# Patient Record
(demographics unavailable — no encounter records)

---

## 2024-10-18 NOTE — HISTORY OF PRESENT ILLNESS
[Y] : Positive pregnancy history [FreeTextEntry1] : PATIENT PRESENT FOR ANNUAL GYN EXAM  [TextBox_4] : pt presents for annual with no complaints  hx of osteoporosis and osteopenia   [Mammogramdate] : 12/19/23 [PapSmeardate] : 8/1//23 [BoneDensityDate] : 2/22/24 [ColonoscopyDate] : 2023 [[facility] : [facility]

## 2024-10-18 NOTE — COUNSELING
[Nutrition/ Exercise/ Weight Management] : nutrition, exercise, weight management [Vitamins/Supplements] : vitamins/supplements [Breast Self Exam] : breast self exam [Bladder Hygiene] : bladder hygiene N/A

## 2024-10-18 NOTE — PROCEDURE
[Cervical Pap Smear] : cervical Pap smear [Liquid Base] : liquid base [Tolerated Well] : the patient tolerated the procedure well [No Complications] : there were no complications [de-identified] : HPV

## 2024-10-22 NOTE — PLAN
[TextEntry] : asthma  continue symbicort Q12 hrs  albuterol as needed  PFT next visit  -elina  patient was counseled to use the machine every night at least 4 hrs  also counseled for weight loss and exercise
